# Patient Record
Sex: FEMALE | Race: BLACK OR AFRICAN AMERICAN
[De-identification: names, ages, dates, MRNs, and addresses within clinical notes are randomized per-mention and may not be internally consistent; named-entity substitution may affect disease eponyms.]

---

## 2023-02-02 RX ORDER — DENOSUMAB 60 MG/ML
60 INJECTION SUBCUTANEOUS
COMMUNITY

## 2025-04-08 ENCOUNTER — HOSPITAL ENCOUNTER (OUTPATIENT)
Facility: HOSPITAL | Age: 80
Setting detail: RECURRING SERIES
Discharge: HOME | End: 2025-04-11
Payer: MEDICARE

## 2025-04-08 PROCEDURE — 97161 PT EVAL LOW COMPLEX 20 MIN: CPT

## 2025-04-08 NOTE — PROGRESS NOTES
(MMT):  Shoulder Left (1-5) Right (1-5)   Shoulder Flexion 4- 4-   Shoulder Ext 4+ 4+   Shoulder ABD 4- p! 4 p!   Shoulder ADD 4+ 4+   Shoulder IR 4- 4-   Shoulder ER 4- 4                                                   Functional Mobility      Bed Mobility:      Scooting: mod (I)       Rolling: mod (I)       Sit-Supine: mod (I)      Transfers:       Sit-Stand: mod (I)           Other Tests / Comments:     Sensation: patient denies numbness and tingling in BUES  Quick DASH: 27.3%     Pain Level (0-10 scale) post treatment: 2/10    ASSESSMENT/Changes in Function: 78 yo female who [facility] to [facility] with c/o bilateral Shoulder pain. Patient reached into HABD on the right shoulder too far when working with her  4-6 weeks ago, but she states that she has had a chronic history of pain in bilateral shoulders. Patient reports difficulty with workouts with her , no quick movements, more difficulty with eccentric control than concentric contraction. Pain ranges from 0/10 at best with resting and light movements; 7-8/10 at worst with once it is \"triggered\"; pt reports they are able to sleep through the night secondary to pain. Patient demonstrates decreased ROM, decreased strength, impaired posture, pain and decreased functional mobility tolerance.    Patient will continue to benefit from skilled PT services to modify and progress therapeutic interventions, address functional mobility deficits, address ROM deficits, address strength deficits, analyze and address soft tissue restrictions, analyze and cue movement patterns, analyze and modify body mechanics/ergonomics, and assess and modify postural abnormalities to attain remaining goals.     [x]  See Plan of Care  []  See progress note/recertification  []  See Discharge Summary         Progress towards goals / Updated goals:  Short Term Goals: To be accomplished in 4 weeks:  Patient will be independent and compliant with HEP to progress toward goals and 
restore prior level of function.  Eval Status: 7-8/10 at worst    Frequency / Duration: Patient to be seen 2 times per week for 6 weeks    Patient/ Caregiver education and instruction: Diagnosis, prognosis, activity modification    [x]  Plan of care has been reviewed with PTA    Ins[place]: [place] [employer] / Plan[place] / Product Type: PPO /      Certification Period: 4/8/2025 - 07/07/25     Vannesa Fall, PT 4/8/2025 11:54 AM    No Physician signature required; Signature on POC no longer required for Medicare as of 1/1/25                                     Please sign and return to :  [facility] at the [address] [place], [place] [address]       Phone: [phone]      Fax:  [phone]

## 2025-04-23 ENCOUNTER — HOSPITAL ENCOUNTER (OUTPATIENT)
Facility: HOSPITAL | Age: 80
Setting detail: RECURRING SERIES
Discharge: HOME | End: 2025-04-26
Payer: MEDICARE

## 2025-04-23 PROCEDURE — 97530 THERAPEUTIC ACTIVITIES: CPT

## 2025-04-23 PROCEDURE — 97112 NEUROMUSCULAR REEDUCATION: CPT

## 2025-04-23 PROCEDURE — 97110 THERAPEUTIC EXERCISES: CPT

## 2025-04-23 NOTE — PROGRESS NOTES
PHYSICAL / OCCUPATIONAL THERAPY - DAILY TREATMENT NOTE     Patient Name: Minerva Liz    Date: 2025    : 1945  Insurance: Payor: [facility] MEDICARE / Plan: CHITRA [facility] MEDICARE / Product Type: PPO /      Patient  verified Yes     Visit #   Current / Total 2 12   Time   In / Out 14:00 14:55   Pain   In / Out 2 2-3   Subjective Functional Status/Changes: I'll get occasional flare ups of pain with certain movements, but overall I feel pretty good   Changes to:  Allergies, Med Hx, Sx Hx?   no       TREATMENT AREA =  Bursitis of left shoulder [M75.52]  Bursitis of right shoulder [M75.51]  Pain in right shoulder [M25.511]  Pain in left shoulder [M25.512]    If an interpreting service is utilized for treatment of this patient, the contents of this document represent the material reviewed with the patient via the .     OBJECTIVE        Therapeutic Procedures:  Tx Min Billable or 1:1 Min (if diff from Tx Min) Procedure, Rationale, Specifics   27  36713 Therapeutic Exercise (timed):  increase ROM, strength, coordination, balance, and proprioception to improve patient's ability to progress to PLOF and address remaining functional goals. (see flow sheet as applicable)    Details if applicable:       14  06709 Neuromuscular Re-Education (timed):  improve balance, coordination, kinesthetic sense, posture, core stability and proprioception to improve patient's ability to develop conscious control of individual muscles and awareness of position of extremities in order to progress to PLOF and address remaining functional goals. (see flow sheet as applicable)    Details if applicable:     14  87640 Therapeutic Activity (timed):  use of dynamic activities replicating functional movements to increase ROM, strength, coordination, balance, and proprioception in order to improve patient's ability to progress to PLOF and address remaining functional goals.  (see flow sheet as applicable)     Details if

## 2025-04-29 ENCOUNTER — HOSPITAL ENCOUNTER (OUTPATIENT)
Facility: HOSPITAL | Age: 80
Setting detail: RECURRING SERIES
Discharge: HOME | End: 2025-05-02
Payer: MEDICARE

## 2025-04-29 PROCEDURE — 97530 THERAPEUTIC ACTIVITIES: CPT

## 2025-04-29 PROCEDURE — 97112 NEUROMUSCULAR REEDUCATION: CPT

## 2025-04-29 PROCEDURE — 97110 THERAPEUTIC EXERCISES: CPT

## 2025-04-29 NOTE — PROGRESS NOTES
PHYSICAL / OCCUPATIONAL THERAPY - DAILY TREATMENT NOTE     Patient Name: Minerva Liz    Date: 2025    : 1945  Insurance: Payor: [facility] MEDICARE / Plan: CHITRA [facility] MEDICARE / Product Type: PPO /      Patient  verified Yes     Visit #   Current / Total 3 12   Time   In / Out 10:40 11:20   Pain   In / Out 5 3   Subjective Functional Status/Changes: I'm still feeling pretty sore from my personal training session the other day    Changes to:  Allergies, Med Hx, Sx Hx?   no       TREATMENT AREA =  Bursitis of left shoulder [M75.52]  Bursitis of right shoulder [M75.51]  Pain in right shoulder [M25.511]  Pain in left shoulder [M25.512]    If an interpreting service is utilized for treatment of this patient, the contents of this document represent the material reviewed with the patient via the .     OBJECTIVE    Therapeutic Procedures:  Tx Min Billable or 1:1 Min (if diff from Tx Min) Procedure, Rationale, Specifics   18  08130 Therapeutic Exercise (timed):  increase ROM, strength, coordination, balance, and proprioception to improve patient's ability to progress to PLOF and address remaining functional goals. (see flow sheet as applicable)    Details if applicable:       11  90599 Neuromuscular Re-Education (timed):  improve balance, coordination, kinesthetic sense, posture, core stability and proprioception to improve patient's ability to develop conscious control of individual muscles and awareness of position of extremities in order to progress to PLOF and address remaining functional goals. (see flow sheet as applicable)    Details if applicable:     11  77481 Therapeutic Activity (timed):  use of dynamic activities replicating functional movements to increase ROM, strength, coordination, balance, and proprioception in order to improve patient's ability to progress to PLOF and address remaining functional goals.  (see flow sheet as applicable)     Details if applicable:     40

## 2025-05-01 ENCOUNTER — HOSPITAL ENCOUNTER (OUTPATIENT)
Facility: HOSPITAL | Age: 80
Setting detail: RECURRING SERIES
End: 2025-05-01
Payer: MEDICARE

## 2025-05-01 ENCOUNTER — TELEPHONE (OUTPATIENT)
Facility: HOSPITAL | Age: 80
End: 2025-05-01

## 2025-05-01 NOTE — TELEPHONE ENCOUNTER
Pt. called to [facility] due to having tire issues and unable to safely drive to PT. Confirmed 5/6 appt.

## 2025-05-06 ENCOUNTER — TELEPHONE (OUTPATIENT)
Facility: HOSPITAL | Age: 80
End: 2025-05-06

## 2025-05-06 ENCOUNTER — HOSPITAL ENCOUNTER (OUTPATIENT)
Facility: HOSPITAL | Age: 80
Setting detail: RECURRING SERIES
Discharge: HOME | End: 2025-05-09
Payer: MEDICARE

## 2025-05-06 PROCEDURE — 97530 THERAPEUTIC ACTIVITIES: CPT

## 2025-05-06 PROCEDURE — 97110 THERAPEUTIC EXERCISES: CPT

## 2025-05-06 PROCEDURE — 97112 NEUROMUSCULAR REEDUCATION: CPT

## 2025-05-06 NOTE — PROGRESS NOTES
PHYSICAL / OCCUPATIONAL THERAPY - DAILY TREATMENT NOTE     Patient Name: Minerva Liz    Date: 2025    : 1945  Insurance: Payor: [facility] MEDICARE / Plan: CHITRA [facility] MEDICARE / Product Type: PPO /      Patient  verified Yes     Visit #   Current / Total 4 12   Time   In / Out 1040 1120   Pain   In / Out 6 6   Subjective Functional Status/Changes: No new complaints - has been working with . Home exercise program going well, the band hor abd and she does it only sometimes.    Changes to:  Allergies, Med Hx, Sx Hx?   no       TREATMENT AREA =  Bursitis of left shoulder [M75.52]  Bursitis of right shoulder [M75.51]  Pain in right shoulder [M25.511]  Pain in left shoulder [M25.512]    If an interpreting service is utilized for treatment of this patient, the contents of this document represent the material reviewed with the patient via the .     OBJECTIVE    Modalities Rationale:     decrease edema, decrease inflammation, and decrease pain to improve patient's ability to progress to PLOF and address remaining functional goals.     min [] Estim Unattended, type/location:                                      []  w/ice    []  w/heat    min [] Estim Attended, type/location:                                     []  w/US     []  w/ice    []  w/heat    []  TENS insruct      min []  Mechanical Traction: type/lbs                   []  pro   []  sup   []  int   []  cont    []  before manual    []  after manual    min []  Ultrasound, settings/location:      min []  Iontophoresis w/ dexamethasone, location:                                               []  take home patch       []  in clinic        min  unbilled []  Ice     []  Heat    location/position:     min []  Paraffin,  details:     min []  Vasopneumatic Device, press/temp:     min []  Whirlpool / Fluido:    If using vaso (only need to measure limb vaso being performed on)      pre-treatment girth :       post-treatment

## 2025-05-06 NOTE — TELEPHONE ENCOUNTER
Called to r/s appt. due to clinic meeting. Pt. will be out of town this day until the following week. WCB to r/s.

## 2025-05-08 ENCOUNTER — HOSPITAL ENCOUNTER (OUTPATIENT)
Facility: HOSPITAL | Age: 80
Setting detail: RECURRING SERIES
Discharge: HOME | End: 2025-05-11
Payer: MEDICARE

## 2025-05-08 PROCEDURE — 97530 THERAPEUTIC ACTIVITIES: CPT

## 2025-05-08 PROCEDURE — 97112 NEUROMUSCULAR REEDUCATION: CPT

## 2025-05-08 PROCEDURE — 97110 THERAPEUTIC EXERCISES: CPT

## 2025-05-08 NOTE — PROGRESS NOTES
PHYSICAL / OCCUPATIONAL THERAPY - DAILY TREATMENT NOTE     Patient Name: Minerva Liz    Date: 2025    : 1945  Insurance: Payor: [facility] MEDICARE / Plan: CHITRA [facility] MEDICARE / Product Type: PPO /      Patient  verified Yes     Visit #   Current / Total 5 12   Time   In / Out 1401 1444   Pain   In / Out 3-4/10 left, 5/10 right 4-5/10 left, 6/10 right   Subjective Functional Status/Changes: Patient states that she may be doing better with PT but she is unsure. She sometimes feels better after completing exercises, but today, she's a little painful    Changes to:  Allergies, Med Hx, Sx Hx?   no       TREATMENT AREA =  Bursitis of left shoulder [M75.52]  Bursitis of right shoulder [M75.51]  Pain in right shoulder [M25.511]  Pain in left shoulder [M25.512]    If an interpreting service is utilized for treatment of this patient, the contents of this document represent the material reviewed with the patient via the .     OBJECTIVE    Therapeutic Procedures:  Tx Min Billable or 1:1 Min (if diff from Tx Min) Procedure, Rationale, Specifics   17  80703 Therapeutic Exercise (timed):  increase ROM, strength, coordination, balance, and proprioception to improve patient's ability to progress to PLOF and address remaining functional goals. (see flow sheet as applicable)    Details if applicable:       10  11869 Neuromuscular Re-Education (timed):  improve balance, coordination, kinesthetic sense, posture, core stability and proprioception to improve patient's ability to develop conscious control of individual muscles and awareness of position of extremities in order to progress to PLOF and address remaining functional goals. (see flow sheet as applicable)    Details if applicable:     16  66034 Therapeutic Activity (timed):  use of dynamic activities replicating functional movements to increase ROM, strength, coordination, balance, and proprioception in order to improve patient's ability to

## 2025-05-08 NOTE — PROGRESS NOTES
[facility] at the [address], [place] [address]  Phone: [phone]      Fax:  [phone]    Continued Plan of Care/ Re-certification for Physical Therapy Services    Patient name: Minerva Liz Start of Care: 2025   Referral source: Adry White* : 1945   Medical/Treatment Diagnosis: Bursitis of right shoulder  Bursitis of left shoulder  Pain in left shoulder  Pain in right shoulder Onset Date:2025     Prior Hospitalization: see medical history Provider#: [id number]     Comorbidities: breast cancer with right mastectomy (15 years ago), arthritis, latex allergy, osteoporosis, hyperthyroid (Graves disease), hypercholesterolemia, pleural pneumonia      Prior Level of Function: functionally independent, no AD, active lifestyle- works with a  1x/week doing strength training, pilates, and floor work       Visits from Start of Care: 5        If an interpreting service is utilized for treatment of this patient, the contents of this document represent the material reviewed with the patient via the .     Reporting Period: 2025-25    The Plan of Care and following information is based on the patient's current status:    Key functional changes: bilateral shoulder strength and AROM, pain levels     Problems/ barriers to goal attainment: pain, co-morbidities    Problem List: pain affecting function, decrease ROM, decrease strength, decrease ADL/functional abilities, decrease activity tolerance, decrease flexibility/joint mobility, and decrease transfer abilities     Treatment Plan: Therapeutic exercise, Neuromuscular reeducation, Manual therapy, Therapeutic activity, Self care/home management, Electric stim unattended , Vasopneumatic device, Aquatic therapy, and Electric stim attended     Goals for this certification period to be accomplished in 8 weeks  Short Term Goals: To be accomplished in 4

## 2025-05-13 ENCOUNTER — HOSPITAL ENCOUNTER (OUTPATIENT)
Facility: HOSPITAL | Age: 80
Setting detail: RECURRING SERIES
Discharge: HOME | End: 2025-05-16
Payer: MEDICARE

## 2025-05-13 PROCEDURE — 97110 THERAPEUTIC EXERCISES: CPT

## 2025-05-13 PROCEDURE — 97112 NEUROMUSCULAR REEDUCATION: CPT

## 2025-05-13 PROCEDURE — 97530 THERAPEUTIC ACTIVITIES: CPT

## 2025-05-13 NOTE — PROGRESS NOTES
PHYSICAL / OCCUPATIONAL THERAPY - DAILY TREATMENT NOTE     Patient Name: Minerva Liz    Date: 2025    : 1945  Insurance: Payor: [facility] MEDICARE / Plan: CHITRA [facility] MEDICARE / Product Type: PPO /      Patient  verified Yes     Visit #   Current / Total 6 12   Time   In / Out 1040 1122   Pain   In / Out 6/10 right, 0/10 left 5/10 right, 0/10 left   Subjective Functional Status/Changes: Patient states that she got a URI after last PT appointment and has been laid up for a time. She had an additional twinge of pain in the right shoulder this morning when reaching for an object. It's been more painful since that time.    Changes to:  Allergies, Med Hx, Sx Hx?   no       TREATMENT AREA =  Bursitis of left shoulder [M75.52]  Bursitis of right shoulder [M75.51]  Pain in right shoulder [M25.511]  Pain in left shoulder [M25.512]    If an interpreting service is utilized for treatment of this patient, the contents of this document represent the material reviewed with the patient via the .     OBJECTIVE    Therapeutic Procedures:  Tx Min Billable or 1:1 Min (if diff from Tx Min) Procedure, Rationale, Specifics   15  12984 Therapeutic Exercise (timed):  increase ROM, strength, coordination, balance, and proprioception to improve patient's ability to progress to PLOF and address remaining functional goals. (see flow sheet as applicable)    Details if applicable:       12  91812 Neuromuscular Re-Education (timed):  improve balance, coordination, kinesthetic sense, posture, core stability and proprioception to improve patient's ability to develop conscious control of individual muscles and awareness of position of extremities in order to progress to PLOF and address remaining functional goals. (see flow sheet as applicable)    Details if applicable:     15  49305 Therapeutic Activity (timed):  use of dynamic activities replicating functional movements to increase ROM, strength,

## 2025-05-15 ENCOUNTER — APPOINTMENT (OUTPATIENT)
Facility: HOSPITAL | Age: 80
End: 2025-05-15
Payer: MEDICARE

## 2025-05-20 ENCOUNTER — APPOINTMENT (OUTPATIENT)
Facility: HOSPITAL | Age: 80
End: 2025-05-20
Payer: MEDICARE

## 2025-05-22 ENCOUNTER — APPOINTMENT (OUTPATIENT)
Facility: HOSPITAL | Age: 80
End: 2025-05-22
Payer: MEDICARE

## 2025-05-27 ENCOUNTER — HOSPITAL ENCOUNTER (OUTPATIENT)
Facility: HOSPITAL | Age: 80
Setting detail: RECURRING SERIES
Discharge: HOME | End: 2025-05-30
Payer: MEDICARE

## 2025-05-27 PROCEDURE — 97112 NEUROMUSCULAR REEDUCATION: CPT

## 2025-05-27 PROCEDURE — 97530 THERAPEUTIC ACTIVITIES: CPT

## 2025-05-27 PROCEDURE — 97035 APP MDLTY 1+ULTRASOUND EA 15: CPT

## 2025-05-27 PROCEDURE — 97110 THERAPEUTIC EXERCISES: CPT

## 2025-05-27 NOTE — PROGRESS NOTES
4+ 4+   Shoulder ABD 4- p! 4 p!   Shoulder ADD 4+ 4+   Shoulder IR 4- 4-   Shoulder ER 4- 4      PN Status (5/8/25): progression in left shoulder flexion and ER and bilateral shoulder IR, no change in bilateral extension, abduction and adduction, mild regression in right shoulder ER  Shoulder Left (1-5) Right (1-5)   Shoulder Flexion 4 4-   Shoulder Ext 4+ 4+   Shoulder ABD 4- p! 4 p!   Shoulder ADD 4+ 4+   Shoulder IR 4 4   Shoulder ER 4 4-    Current (5/27/25): bilateral IR: 4/5    Patient will improve pain in bilateral shoulders to 2/10 at worst to improve overhead lifting  tolerance and restore prior level of function.  Eval Status: 7-8/10 at worst  PN Status (5/8/25): 7/10 at worst, progressing          PLAN  Yes  Continue plan of care  []  Upgrade activities as tolerated  []  Discharge due to :  []  Other:    Vannesa Fall PT    5/27/2025    10:18 AM    Future Appointments   Date Time Provider Department Center   5/27/2025 10:40 AM Vannesa Fall, PT [facility]   5/29/2025  2:00 PM Johana Goldstein, PT [facility]   6/3/2025 10:40 AM Vannesa Fall, PT [facility]   6/5/2025  2:40 PM Vannesa Fall, PT [facility]   6/10/2025 10:40 AM Benja Souza, PTA [facility]   6/12/2025  2:40 PM Benja Souza, PTA [facility]   6/17/2025 10:40 AM Vannesa Fall, PT [facility]   6/19/2025 12:00 PM Johana Goldstein, PT [facility]   6/24/2025 10:40 AM Benja Souza, PTA [facility]   6/26/2025  2:00 PM Johana Goldstein, PT [facility]

## 2025-05-29 ENCOUNTER — HOSPITAL ENCOUNTER (OUTPATIENT)
Facility: HOSPITAL | Age: 80
Setting detail: RECURRING SERIES
End: 2025-05-29
Payer: MEDICARE

## 2025-05-29 ENCOUNTER — TELEPHONE (OUTPATIENT)
Facility: HOSPITAL | Age: 80
End: 2025-05-29

## 2025-06-03 ENCOUNTER — TELEPHONE (OUTPATIENT)
Facility: HOSPITAL | Age: 80
End: 2025-06-03

## 2025-06-03 NOTE — TELEPHONE ENCOUNTER
LVM re: No Show of this morning's appointment. Patient instructed to call back to rechedule or for inquiry re: future appointments.

## 2025-06-03 NOTE — TELEPHONE ENCOUNTER
Pt. called and stated that she n/s her appt. today due to over sleeping. She declined to r/s and would rather be d/c. She wantes to continue to do her exercises at home. Her Dr. agreed.